# Patient Record
Sex: FEMALE | Race: BLACK OR AFRICAN AMERICAN | URBAN - METROPOLITAN AREA
[De-identification: names, ages, dates, MRNs, and addresses within clinical notes are randomized per-mention and may not be internally consistent; named-entity substitution may affect disease eponyms.]

---

## 2017-03-13 ENCOUNTER — IMPORTED ENCOUNTER (OUTPATIENT)
Dept: URBAN - METROPOLITAN AREA CLINIC 1 | Facility: CLINIC | Age: 52
End: 2017-03-13

## 2017-03-13 NOTE — PATIENT DISCUSSION
Patient is a candidate for LASIK OU. Aware she needs to be out of her contact lenses for two weeks prior to surgery. Educated about presbyopia and aware she will need readers post surgery. Patient understands she may not achieve perfect 20/20 and may need glasses for night driving.   She also knows she will likely have dry eye post surgery

## 2017-04-03 ENCOUNTER — IMPORTED ENCOUNTER (OUTPATIENT)
Dept: URBAN - METROPOLITAN AREA CLINIC 1 | Facility: CLINIC | Age: 52
End: 2017-04-03

## 2017-04-03 NOTE — PATIENT DISCUSSION
LASIK OU 04/07/17. Patient given paper RX for Valium and coupon for Durezol. Ocuflox and Durezol sent in to [facility]. Consent signed today.

## 2017-04-07 ENCOUNTER — IMPORTED ENCOUNTER (OUTPATIENT)
Dept: URBAN - METROPOLITAN AREA CLINIC 1 | Facility: CLINIC | Age: 52
End: 2017-04-07

## 2017-04-08 ENCOUNTER — IMPORTED ENCOUNTER (OUTPATIENT)
Dept: URBAN - METROPOLITAN AREA CLINIC 1 | Facility: CLINIC | Age: 52
End: 2017-04-08

## 2017-04-08 PROBLEM — Z09: Noted: 2017-04-08

## 2017-04-08 NOTE — PATIENT DISCUSSION
POD #1 LASIK OU - doing well. BCL removed by PMG with no complications. Cont Durezol BID OUCont Ocuflox BID OUCont ATs QID OU routinely. RTC as scheduled 4/14/17 w/ PMG.

## 2017-04-14 ENCOUNTER — IMPORTED ENCOUNTER (OUTPATIENT)
Dept: URBAN - METROPOLITAN AREA CLINIC 1 | Facility: CLINIC | Age: 52
End: 2017-04-14

## 2017-04-14 PROBLEM — Z98.89: Noted: 2017-04-14

## 2017-04-14 NOTE — PATIENT DISCUSSION
POW #1 LASIK OU - doing well. [facility] excellent. Use Durezol BID OU till out then d/c Cont Tears QID OU routinely. Can use 1.25-1.50 for near vision. Return for an appointment in 3 week LASIK PO OU with Dr. Tristan Baird.

## 2017-05-06 ENCOUNTER — IMPORTED ENCOUNTER (OUTPATIENT)
Dept: URBAN - METROPOLITAN AREA CLINIC 1 | Facility: CLINIC | Age: 52
End: 2017-05-06

## 2017-05-06 PROBLEM — Z98.89: Noted: 2017-05-06

## 2017-05-06 PROCEDURE — 99024 POSTOP FOLLOW-UP VISIT: CPT

## 2017-05-06 NOTE — PATIENT DISCUSSION
POM #1 LASIK OU - [facility] doing well. Increase in IOP today 38/36. Steroid Responder. DC Durezol Begin Combigan BID OU (sample given). 1 gtt instilled by RBF in office today Continue Tears QID Routinely OUReturn for an appointment in monday/tuesday 10 (IOP check) with Dr. Claudeen Cobble.

## 2017-05-08 ENCOUNTER — IMPORTED ENCOUNTER (OUTPATIENT)
Dept: URBAN - METROPOLITAN AREA CLINIC 1 | Facility: CLINIC | Age: 52
End: 2017-05-08

## 2017-05-08 PROBLEM — Z98.89: Noted: 2017-05-08

## 2017-05-08 PROCEDURE — 99024 POSTOP FOLLOW-UP VISIT: CPT

## 2017-05-08 NOTE — PATIENT DISCUSSION
1. POM #1 LASIK OU - doing well IOP much improved today 18/16. Cont Combigan BID OU until gone Continue Tears QID Routinely OU 2. Steroid ResponderReturn for an appointment in 2 months final LASIK PO (will subsequently need COAG work up) with Dr. Torsten Del Real.

## 2017-07-10 ENCOUNTER — IMPORTED ENCOUNTER (OUTPATIENT)
Dept: URBAN - METROPOLITAN AREA CLINIC 1 | Facility: CLINIC | Age: 52
End: 2017-07-10

## 2017-07-10 PROBLEM — Z98.89: Noted: 2017-07-10

## 2017-07-10 NOTE — PATIENT DISCUSSION
1. POM #3 LASIK OU - doing well. Continue Tears QID Routinely OU 2. OHTN OU/ Glaucoma Suspect (CD 0.5/0.4) Steroid Responder. Fm Hx (Mother). Patient should return for baseline testing. Return for an appointment in 3 mo 30 OCT/ VF 24-2 OU with Dr. Annel Gil.

## 2022-03-18 PROBLEM — I10 HYPERTENSION, UNCONTROLLED: Status: ACTIVE | Noted: 2020-08-17

## 2022-03-19 PROBLEM — R07.89 ATYPICAL CHEST PAIN: Status: ACTIVE | Noted: 2020-08-17

## 2022-03-19 PROBLEM — R10.9 ABDOMINAL PAIN: Status: ACTIVE | Noted: 2018-01-23

## 2022-03-19 PROBLEM — K21.9 GERD (GASTROESOPHAGEAL REFLUX DISEASE): Status: ACTIVE | Noted: 2020-08-17

## 2022-03-19 PROBLEM — E87.6 HYPOKALEMIA: Status: ACTIVE | Noted: 2020-08-17

## 2022-03-19 PROBLEM — R07.9 CHEST PAIN: Status: ACTIVE | Noted: 2020-08-17

## 2022-04-02 ASSESSMENT — VISUAL ACUITY
OD_CC: 20/30+1
OS_SC: 20/25
OS_CC: 20/20
OD_CC: 20/20
OD_CC: 20/25
OS_CC: 20/20
OS_CC: 20/30
OD_CC: 20/20
OS_CC: 20/25
OD_CC: 20/20
OD_CC: J2
OS_CC: 20/25

## 2022-04-02 ASSESSMENT — KERATOMETRY
OS_K1POWER_DIOPTERS: 42.75
OD_K1POWER_DIOPTERS: 42.50
OS_K2POWER_DIOPTERS: 44.75
OD_K2POWER_DIOPTERS: 45.00
OS_K2POWER_DIOPTERS: 45.00
OS_AXISANGLE2_DEGREES: 078
OS_K1POWER_DIOPTERS: 43.00
OD_AXISANGLE2_DEGREES: 086
OD_AXISANGLE2_DEGREES: 089
OS_AXISANGLE2_DEGREES: 082
OD_K2POWER_DIOPTERS: 45.00
OD_K1POWER_DIOPTERS: 42.25

## 2022-04-02 ASSESSMENT — TONOMETRY
OD_IOP_MMHG: 24
OD_IOP_MMHG: 18
OS_IOP_MMHG: 36
OS_IOP_MMHG: 16
OS_IOP_MMHG: 20
OD_IOP_MMHG: 38
OS_IOP_MMHG: 22
OD_IOP_MMHG: 20

## 2023-03-10 ENCOUNTER — HOSPITAL ENCOUNTER (EMERGENCY)
Facility: HOSPITAL | Age: 58
Discharge: HOME | End: 2023-03-10
Attending: EMERGENCY MEDICINE
Payer: COMMERCIAL

## 2023-03-10 ENCOUNTER — APPOINTMENT (OUTPATIENT)
Facility: HOSPITAL | Age: 58
End: 2023-03-10
Payer: COMMERCIAL

## 2023-03-10 VITALS
SYSTOLIC BLOOD PRESSURE: 181 MMHG | OXYGEN SATURATION: 100 % | HEART RATE: 70 BPM | TEMPERATURE: 98.3 F | RESPIRATION RATE: 16 BRPM | DIASTOLIC BLOOD PRESSURE: 115 MMHG

## 2023-03-10 DIAGNOSIS — I10 ESSENTIAL HYPERTENSION: ICD-10-CM

## 2023-03-10 DIAGNOSIS — M47.812 NECK ARTHRITIS: Primary | ICD-10-CM

## 2023-03-10 LAB
ALBUMIN SERPL-MCNC: 4.1 G/DL (ref 3.4–5)
ALBUMIN/GLOB SERPL: 1.1 (ref 0.8–1.7)
ALP SERPL-CCNC: 102 U/L (ref 45–117)
ALT SERPL-CCNC: 43 U/L (ref 13–56)
ANION GAP SERPL CALC-SCNC: 4 MMOL/L (ref 3–18)
AST SERPL-CCNC: 33 U/L (ref 10–38)
BASOPHILS # BLD: 0 K/UL (ref 0–0.1)
BASOPHILS NFR BLD: 0 % (ref 0–2)
BILIRUB SERPL-MCNC: 0.3 MG/DL (ref 0.2–1)
BUN SERPL-MCNC: 25 MG/DL (ref 7–18)
BUN/CREAT SERPL: 23 (ref 12–20)
CALCIUM SERPL-MCNC: 9 MG/DL (ref 8.5–10.1)
CHLORIDE SERPL-SCNC: 105 MMOL/L (ref 100–111)
CO2 SERPL-SCNC: 29 MMOL/L (ref 21–32)
CREAT SERPL-MCNC: 1.07 MG/DL (ref 0.6–1.3)
DIFFERENTIAL METHOD BLD: ABNORMAL
EOSINOPHIL # BLD: 0.2 K/UL (ref 0–0.4)
EOSINOPHIL NFR BLD: 2 % (ref 0–5)
ERYTHROCYTE [DISTWIDTH] IN BLOOD BY AUTOMATED COUNT: 13.4 % (ref 11.6–14.5)
GLOBULIN SER CALC-MCNC: 3.8 G/DL (ref 2–4)
GLUCOSE SERPL-MCNC: 102 MG/DL (ref 74–99)
HCT VFR BLD AUTO: 38.8 % (ref 35–45)
HGB BLD-MCNC: 12.9 G/DL (ref 12–16)
IMM GRANULOCYTES # BLD AUTO: 0 K/UL (ref 0–0.04)
IMM GRANULOCYTES NFR BLD AUTO: 0 % (ref 0–0.5)
LYMPHOCYTES # BLD: 2.2 K/UL (ref 0.9–3.6)
LYMPHOCYTES NFR BLD: 25 % (ref 21–52)
MCH RBC QN AUTO: 31.2 PG (ref 24–34)
MCHC RBC AUTO-ENTMCNC: 33.2 G/DL (ref 31–37)
MCV RBC AUTO: 93.9 FL (ref 78–100)
MONOCYTES # BLD: 0.6 K/UL (ref 0.05–1.2)
MONOCYTES NFR BLD: 6 % (ref 3–10)
NEUTS SEG # BLD: 5.9 K/UL (ref 1.8–8)
NEUTS SEG NFR BLD: 66 % (ref 40–73)
NRBC # BLD: 0 K/UL (ref 0–0.01)
NRBC BLD-RTO: 0 PER 100 WBC
PLATELET # BLD AUTO: 276 K/UL (ref 135–420)
PMV BLD AUTO: 10.3 FL (ref 9.2–11.8)
POTASSIUM SERPL-SCNC: 3.9 MMOL/L (ref 3.5–5.5)
PROT SERPL-MCNC: 7.9 G/DL (ref 6.4–8.2)
RBC # BLD AUTO: 4.13 M/UL (ref 4.2–5.3)
SODIUM SERPL-SCNC: 138 MMOL/L (ref 136–145)
WBC # BLD AUTO: 8.9 K/UL (ref 4.6–13.2)

## 2023-03-10 PROCEDURE — 96374 THER/PROPH/DIAG INJ IV PUSH: CPT | Performed by: EMERGENCY MEDICINE

## 2023-03-10 PROCEDURE — 6370000000 HC RX 637 (ALT 250 FOR IP): Performed by: EMERGENCY MEDICINE

## 2023-03-10 PROCEDURE — 70450 CT HEAD/BRAIN W/O DYE: CPT

## 2023-03-10 PROCEDURE — 99284 EMERGENCY DEPT VISIT MOD MDM: CPT | Performed by: EMERGENCY MEDICINE

## 2023-03-10 PROCEDURE — 80053 COMPREHEN METABOLIC PANEL: CPT

## 2023-03-10 PROCEDURE — 85025 COMPLETE CBC W/AUTO DIFF WBC: CPT

## 2023-03-10 PROCEDURE — 6360000002 HC RX W HCPCS: Performed by: EMERGENCY MEDICINE

## 2023-03-10 RX ORDER — KETOROLAC TROMETHAMINE 15 MG/ML
15 INJECTION, SOLUTION INTRAMUSCULAR; INTRAVENOUS EVERY 6 HOURS PRN
Status: DISCONTINUED | OUTPATIENT
Start: 2023-03-10 | End: 2023-03-10 | Stop reason: HOSPADM

## 2023-03-10 RX ORDER — LOSARTAN POTASSIUM 50 MG/1
100 TABLET ORAL DAILY
Status: DISCONTINUED | OUTPATIENT
Start: 2023-03-10 | End: 2023-03-10

## 2023-03-10 RX ORDER — MELOXICAM 7.5 MG/1
7.5 TABLET ORAL DAILY
Qty: 30 TABLET | Refills: 0 | Status: SHIPPED | OUTPATIENT
Start: 2023-03-10

## 2023-03-10 RX ORDER — LOSARTAN POTASSIUM 50 MG/1
100 TABLET ORAL
Status: COMPLETED | OUTPATIENT
Start: 2023-03-10 | End: 2023-03-10

## 2023-03-10 RX ORDER — LIDOCAINE 4 G/G
1 PATCH TOPICAL
Status: DISCONTINUED | OUTPATIENT
Start: 2023-03-10 | End: 2023-03-10 | Stop reason: HOSPADM

## 2023-03-10 RX ORDER — LIDOCAINE 50 MG/G
1 PATCH TOPICAL DAILY
Qty: 10 PATCH | Refills: 0 | Status: SHIPPED | OUTPATIENT
Start: 2023-03-10 | End: 2023-03-20

## 2023-03-10 RX ADMIN — LOSARTAN POTASSIUM 100 MG: 50 TABLET, FILM COATED ORAL at 06:27

## 2023-03-10 RX ADMIN — KETOROLAC TROMETHAMINE 15 MG: 15 INJECTION, SOLUTION INTRAMUSCULAR; INTRAVENOUS at 06:10

## 2023-03-10 ASSESSMENT — PAIN DESCRIPTION - LOCATION: LOCATION: NECK

## 2023-03-10 ASSESSMENT — ENCOUNTER SYMPTOMS
FACIAL SWELLING: 0
ABDOMINAL PAIN: 1
SHORTNESS OF BREATH: 0
SORE THROAT: 0

## 2023-03-10 ASSESSMENT — PAIN SCALES - GENERAL: PAINLEVEL_OUTOF10: 6

## 2023-03-10 ASSESSMENT — PAIN DESCRIPTION - ORIENTATION: ORIENTATION: LEFT

## 2023-03-10 ASSESSMENT — PAIN DESCRIPTION - DESCRIPTORS: DESCRIPTORS: DULL

## 2023-03-10 NOTE — ED PROVIDER NOTES
`[facility] EMERGENCY DEPT  eMERGENCY dEPARTMENT eNCOUnter      Pt Name: Sridevi Guardado  MRN: [id number]  [place] 1965 of evaluation: 3/10/2023  Provider:Yovanny Casanova MD    CHIEF COMPLAINT         HPI  Sridevi Guardado is a 59 y.o. female per chart review has a h/o hypertension and headaches. Tonight the patient developed a severe pounding headache that has been persistent. No nausea or vomiting      ROS  Review of Systems   Constitutional:  Positive for activity change. HENT:  Negative for congestion, ear discharge, ear pain, facial swelling and sore throat. Respiratory:  Negative for shortness of breath. Cardiovascular:  Negative for chest pain. Gastrointestinal:  Positive for abdominal pain. Musculoskeletal:  Positive for arthralgias. Skin: Negative. Neurological:  Positive for headaches. Negative for dizziness, tremors, seizures, facial asymmetry, weakness and numbness. Psychiatric/Behavioral:  Negative for behavioral problems and confusion. The patient is not nervous/anxious. Except as noted above the remainder of the review of systems was reviewed and negative.        PAST MEDICAL HISTORY     Past Medical History:   Diagnosis Date    Gastrointestinal disorder     High cholesterol     Hypertension     Sinusitis     Vertigo          SURGICAL HISTORY       Past Surgical History:   Procedure Laterality Date    GYN      fibroid embolization    ORTHOPEDIC SURGERY Left     foot tendon repair    TUBAL LIGATION           CURRENTMEDICATIONS       Previous Medications    ALBUTEROL SULFATE HFA (PROVENTIL;VENTOLIN;PROAIR) 108 (90 BASE) MCG/ACT INHALER    Inhale 2 puffs into the lungs every 4 hours as needed    AMLODIPINE (NORVASC) 2.5 MG TABLET    Take 10 mg by mouth daily    BUTALBITAL-APAP-CAFFEINE -40 MG CAPS PER CAPSULE    Take 1 capsule by mouth every 6 hours as needed    CETIRIZINE (ZYRTEC) 10 MG TABLET    Take 10 mg by mouth    FLUTICASONE (FLONASE) 50 MCG/ACT NASAL SPRAY    2 sprays by Nasal route daily    METHOCARBAMOL (ROBAXIN) 750 MG TABLET    Take 750 mg by mouth 4 times daily as needed    NAPROXEN (NAPROSYN) 375 MG TABLET    Take 375 mg by mouth 2 times daily (with meals)    OLMESARTAN (BENICAR) 40 MG TABLET    TK 1 T PO QD    PANTOPRAZOLE (PROTONIX) 40 MG TABLET    Take 40 mg by mouth daily    POTASSIUM CHLORIDE (KLOR-CON M) 20 MEQ EXTENDED RELEASE TABLET    Take 20 mEq by mouth daily    PROMETHAZINE (PHENERGAN) 12.5 MG TABLET    Take 12.5 mg by mouth every 8 hours as needed       ALLERGIES     Nalbuphine, Ciprofloxacin, and Hydrocodone-acetaminophen    FAMILY HISTORY       Family History   Problem Relation Age of Onset    Diabetes Maternal Grandmother     Asthma Sister     Heart Disease Maternal Grandmother     Asthma Brother     Heart Disease Sister     Stroke Maternal Grandmother     Heart Failure Maternal Grandmother           SOCIAL HISTORY       Social History     Socioeconomic History    Marital status:    Tobacco Use    Smoking status: Never    Smokeless tobacco: Never   Substance and Sexual Activity    Alcohol use: No    Drug use: No         PHYSICAL EXAM       ED Triage Vitals [03/10/23 0220]   BP Temp Temp src Heart Rate Resp SpO2 Height Weight   (!) 177/109 98.3 °F (36.8 °C) -- 70 16 100 % -- --       Physical Exam  Vitals and nursing note reviewed. Constitutional:       General: She is not in acute distress. Appearance: Normal appearance. She is not ill-appearing, toxic-appearing or diaphoretic. HENT:      Head: Normocephalic and atraumatic. Right Ear: Tympanic membrane, ear canal and external ear normal. There is no impacted cerumen. Left Ear: Tympanic membrane, ear canal and external ear normal. There is no impacted cerumen. Nose: Nose normal. No congestion or rhinorrhea. Mouth/Throat:      Mouth: Mucous membranes are moist.      Pharynx: Oropharynx is clear. No oropharyngeal exudate or posterior oropharyngeal erythema.    Eyes: General:         Right eye: No discharge. Left eye: No discharge. Cardiovascular:      Rate and Rhythm: Normal rate and regular rhythm. Pulses: Normal pulses. Heart sounds: Normal heart sounds. No murmur heard. No friction rub. No gallop. Pulmonary:      Effort: Pulmonary effort is normal. No respiratory distress. Breath sounds: Normal breath sounds. No stridor. No wheezing, rhonchi or rales. Chest:      Chest wall: No tenderness. Abdominal:      General: Abdomen is flat. Palpations: Abdomen is soft. Musculoskeletal:         General: No tenderness or signs of injury. Normal range of motion. Cervical back: Normal range of motion and neck supple. No tenderness. Lymphadenopathy:      Cervical: No cervical adenopathy. Skin:     General: Skin is warm and dry. Findings: No erythema or rash. Neurological:      General: No focal deficit present. Mental Status: She is alert and oriented to person, place, and time. Psychiatric:         Mood and Affect: Mood normal.         Behavior: Behavior normal.       No results found for this or any previous visit (from the past 24 hour(s)). No results found. PROCEDURES:  Unless otherwise noted below, none     Procedures      EMERGENCY DEPARTMENT COURSE and DIFFERENTIALDIAGNOSIS/ MDM:   Vitals:    Vitals:    03/10/23 0220   BP: (!) 177/109   Pulse: 70   Resp: 16   Temp: 98.3 °F (36.8 °C)   SpO2: 100%       [facility]        3:44 AM Upon re-evaluation the patient's symptoms have improved. Pt has non-toxic appearance and condition is stable for discharge. Patient was informed of tests & results, instructed to f/u with @ and return to the ED upon worsening of symptoms. All questions and concerns were addressed.      FINAL IMPRESSION        Migraine    Arthrits neck    Hypertension    DISPOSITION/PLAN     DISPOSITION  -  D/C    DISCHARGE MEDICATIONS:  New Prescriptions    No medications on file           PATIENT REFERRED TO: PCP in 2 days. Return to ER prn. (Please note that portions of this note were completed with a voice recognitionprogram.  Efforts were made to edit the dictations but occasionally words are mis-transcribed.)    Isatu Araya.  Bebe Christensen MD(electronically signed)  Attending Emergency Physician           Shiloh Mack MD  03/12/23 9599

## 2023-03-10 NOTE — ED NOTES
Informed Dr. Kern Number /102, per  to give pt 40 mg Benicar. Informed MD that medication not in pyxis and that per  mg of Losartan is comparable to 40 mg of Benicar. Per  to give Losartan 100 mg and monitor BP prior to discharge.      Radha Meng RN  03/10/23 8994

## 2023-03-10 NOTE — ED NOTES
Discharge teaching provided to pt regarding treatment received, medications prescribed, and follow-up care. Pt verbalized understanding directions and follow up care. Pt left ambulatory with discharge paperwork in hand.       Tri Aguero RN  03/10/23 7911

## 2023-03-10 NOTE — DISCHARGE INSTRUCTIONS
Pinched Nerve in the Neck: Care Instructions  Overview  A pinched nerve in the neck happens when a vertebra or disc in the upper part of your spine squeezes a nerve. This can happen because of an injury. Or it can just happen with age. The changes that happen from an injury or aging may put pressure on a nearby nerve root, pinching it. This causes symptoms such as sharp pain in your neck, shoulder, arm, hand, or back. You may also have tingling or numbness. Sometimes it makes your arm weaker. The symptoms may get worse when you turn your head, cough, or sneeze. For many people, the symptoms get better over time and finally go away. Early treatment usually includes medicines for pain and swelling. Sometimes physical therapy and special exercises may help. Follow-up care is a key part of your treatment and safety. Be sure to make and go to all appointments, and call your doctor if you are having problems. It's also a good idea to know your test results and keep a list of the medicines you take. How can you care for yourself at home? Be safe with medicines. Read and follow all instructions on the label. If the doctor gave you a prescription medicine for pain, take it as prescribed. If you are not taking a prescription pain medicine, ask your doctor if you can take an over-the-counter medicine. Try using a heating pad on a low or medium setting for 15 to 20 minutes every 2 or 3 hours. Try a warm shower in place of one session with the heating pad. You can also buy single-use heat wraps that last up to 8 hours. You can also try an ice pack for 10 to 15 minutes every 2 to 3 hours. There isn't strong evidence that either heat or ice will help. But you can try them to see if they help you. Don't spend too long in one position. Take short breaks to move around and change positions. Wear a seat belt and shoulder harness when you are in a car.   Sleep with a pillow under your head and neck that keeps your neck straight. If you were given a neck brace (cervical collar) to limit neck motion, wear it as instructed for as many days as your doctor tells you to. Do not wear it longer than you were told to. Wearing a brace for too long can lead to neck stiffness and can weaken the neck muscles. Follow your doctor's instructions for gentle neck-stretching exercises. Do not smoke. Smoking can slow healing of your discs. If you need help quitting, talk to your doctor about stop-smoking programs and medicines. These can increase your chances of quitting for good. Avoid activities that may make your symptoms worse. Ask your doctor when you can start doing those activities again. When should you call for help? Call 911 anytime you think you may need emergency care. For example, call if:    You are unable to move an arm or a leg at all. Call your doctor now or seek immediate medical care if:    You have new or worse symptoms in your arms, legs, chest, belly, or buttocks. Symptoms may include:  Numbness or tingling. Weakness. Pain. You lose bladder or bowel control. Watch closely for changes in your health, and be sure to contact your doctor if:    You are not getting better as expected. Where can you learn more? Go to http://www.woods.com/ and enter Y143 to learn more about \"Pinched Nerve in the Neck: Care Instructions. \"  Current as of: August 25, 2022               Content Version: 13.5  © 5385-6896 [employer]. Care instructions adapted under license by [facility] [employer] [facility] ([facility]). If you have questions about a medical condition or this instruction, always ask your healthcare professional. [place] [address] any warranty or liability for your use of this information.

## 2023-12-03 ENCOUNTER — APPOINTMENT (OUTPATIENT)
Facility: HOSPITAL | Age: 58
End: 2023-12-03
Payer: COMMERCIAL

## 2023-12-03 ENCOUNTER — HOSPITAL ENCOUNTER (EMERGENCY)
Facility: HOSPITAL | Age: 58
Discharge: HOME | End: 2023-12-03
Attending: EMERGENCY MEDICINE
Payer: COMMERCIAL

## 2023-12-03 VITALS
DIASTOLIC BLOOD PRESSURE: 77 MMHG | OXYGEN SATURATION: 95 % | BODY MASS INDEX: 37.21 KG/M2 | TEMPERATURE: 98.2 F | RESPIRATION RATE: 16 BRPM | WEIGHT: 210 LBS | SYSTOLIC BLOOD PRESSURE: 165 MMHG | HEIGHT: 63 IN | HEART RATE: 101 BPM

## 2023-12-03 DIAGNOSIS — R05.1 ACUTE COUGH: ICD-10-CM

## 2023-12-03 DIAGNOSIS — H66.002 NON-RECURRENT ACUTE SUPPURATIVE OTITIS MEDIA OF LEFT EAR WITHOUT SPONTANEOUS RUPTURE OF TYMPANIC MEMBRANE: Primary | ICD-10-CM

## 2023-12-03 LAB
ALBUMIN SERPL-MCNC: 3.8 G/DL (ref 3.4–5)
ALBUMIN/GLOB SERPL: 1 (ref 0.8–1.7)
ALP SERPL-CCNC: 110 U/L (ref 45–117)
ALT SERPL-CCNC: 30 U/L (ref 13–56)
ANION GAP SERPL CALC-SCNC: 5 MMOL/L (ref 3–18)
AST SERPL-CCNC: 24 U/L (ref 10–38)
BASOPHILS # BLD: 0 K/UL (ref 0–0.1)
BASOPHILS NFR BLD: 0 % (ref 0–2)
BILIRUB SERPL-MCNC: 0.4 MG/DL (ref 0.2–1)
BUN SERPL-MCNC: 12 MG/DL (ref 7–18)
BUN/CREAT SERPL: 13 (ref 12–20)
CALCIUM SERPL-MCNC: 8.8 MG/DL (ref 8.5–10.1)
CHLORIDE SERPL-SCNC: 109 MMOL/L (ref 100–111)
CO2 SERPL-SCNC: 28 MMOL/L (ref 21–32)
CREAT SERPL-MCNC: 0.96 MG/DL (ref 0.6–1.3)
DIFFERENTIAL METHOD BLD: ABNORMAL
EKG ATRIAL RATE: 101 BPM
EKG DIAGNOSIS: NORMAL
EKG P AXIS: 66 DEGREES
EKG P-R INTERVAL: 158 MS
EKG Q-T INTERVAL: 346 MS
EKG QRS DURATION: 86 MS
EKG QTC CALCULATION (BAZETT): 448 MS
EKG R AXIS: -10 DEGREES
EKG T AXIS: 58 DEGREES
EKG VENTRICULAR RATE: 101 BPM
EOSINOPHIL # BLD: 0.3 K/UL (ref 0–0.4)
EOSINOPHIL NFR BLD: 3 % (ref 0–5)
ERYTHROCYTE [DISTWIDTH] IN BLOOD BY AUTOMATED COUNT: 13.2 % (ref 11.6–14.5)
FLUAV AG NPH QL IA: NEGATIVE
FLUBV AG NOSE QL IA: NEGATIVE
GLOBULIN SER CALC-MCNC: 4 G/DL (ref 2–4)
GLUCOSE SERPL-MCNC: 109 MG/DL (ref 74–99)
HCT VFR BLD AUTO: 38.5 % (ref 35–45)
HGB BLD-MCNC: 12.9 G/DL (ref 12–16)
IMM GRANULOCYTES # BLD AUTO: 0 K/UL (ref 0–0.04)
IMM GRANULOCYTES NFR BLD AUTO: 0 % (ref 0–0.5)
LYMPHOCYTES # BLD: 2.8 K/UL (ref 0.9–3.6)
LYMPHOCYTES NFR BLD: 29 % (ref 21–52)
MCH RBC QN AUTO: 31.2 PG (ref 24–34)
MCHC RBC AUTO-ENTMCNC: 33.5 G/DL (ref 31–37)
MCV RBC AUTO: 93 FL (ref 78–100)
MONOCYTES # BLD: 0.6 K/UL (ref 0.05–1.2)
MONOCYTES NFR BLD: 6 % (ref 3–10)
NEUTS SEG # BLD: 5.8 K/UL (ref 1.8–8)
NEUTS SEG NFR BLD: 60 % (ref 40–73)
NRBC # BLD: 0 K/UL (ref 0–0.01)
NRBC BLD-RTO: 0 PER 100 WBC
NT PRO BNP: 241 PG/ML (ref 0–900)
PLATELET # BLD AUTO: 259 K/UL (ref 135–420)
PMV BLD AUTO: 10.4 FL (ref 9.2–11.8)
POTASSIUM SERPL-SCNC: 3.4 MMOL/L (ref 3.5–5.5)
PROT SERPL-MCNC: 7.8 G/DL (ref 6.4–8.2)
RBC # BLD AUTO: 4.14 M/UL (ref 4.2–5.3)
SARS-COV-2 RDRP RESP QL NAA+PROBE: NOT DETECTED
SODIUM SERPL-SCNC: 142 MMOL/L (ref 136–145)
SOURCE: NORMAL
TROPONIN I SERPL HS-MCNC: 9 NG/L (ref 0–54)
WBC # BLD AUTO: 9.7 K/UL (ref 4.6–13.2)

## 2023-12-03 PROCEDURE — 85025 COMPLETE CBC W/AUTO DIFF WBC: CPT

## 2023-12-03 PROCEDURE — 93005 ELECTROCARDIOGRAM TRACING: CPT | Performed by: EMERGENCY MEDICINE

## 2023-12-03 PROCEDURE — 6360000002 HC RX W HCPCS: Performed by: EMERGENCY MEDICINE

## 2023-12-03 PROCEDURE — 87804 INFLUENZA ASSAY W/OPTIC: CPT

## 2023-12-03 PROCEDURE — 80053 COMPREHEN METABOLIC PANEL: CPT

## 2023-12-03 PROCEDURE — 93010 ELECTROCARDIOGRAM REPORT: CPT | Performed by: INTERNAL MEDICINE

## 2023-12-03 PROCEDURE — 83880 ASSAY OF NATRIURETIC PEPTIDE: CPT

## 2023-12-03 PROCEDURE — 84484 ASSAY OF TROPONIN QUANT: CPT

## 2023-12-03 PROCEDURE — 87635 SARS-COV-2 COVID-19 AMP PRB: CPT

## 2023-12-03 PROCEDURE — 71046 X-RAY EXAM CHEST 2 VIEWS: CPT

## 2023-12-03 RX ORDER — HYDRALAZINE HYDROCHLORIDE 20 MG/ML
20 INJECTION INTRAMUSCULAR; INTRAVENOUS ONCE
Status: COMPLETED | OUTPATIENT
Start: 2023-12-03 | End: 2023-12-03

## 2023-12-03 RX ORDER — BENZONATATE 100 MG/1
100 CAPSULE ORAL 2 TIMES DAILY PRN
Qty: 20 CAPSULE | Refills: 0 | Status: SHIPPED | OUTPATIENT
Start: 2023-12-03 | End: 2023-12-13

## 2023-12-03 RX ORDER — LABETALOL HYDROCHLORIDE 5 MG/ML
10 INJECTION, SOLUTION INTRAVENOUS
Status: COMPLETED | OUTPATIENT
Start: 2023-12-03 | End: 2023-12-03

## 2023-12-03 RX ORDER — LABETALOL HYDROCHLORIDE 5 MG/ML
10 INJECTION, SOLUTION INTRAVENOUS ONCE
Status: DISCONTINUED | OUTPATIENT
Start: 2023-12-03 | End: 2023-12-03

## 2023-12-03 RX ORDER — AMOXICILLIN AND CLAVULANATE POTASSIUM 875; 125 MG/1; MG/1
1 TABLET, FILM COATED ORAL 2 TIMES DAILY
Qty: 14 TABLET | Refills: 0 | Status: SHIPPED | OUTPATIENT
Start: 2023-12-03 | End: 2023-12-10

## 2023-12-03 RX ADMIN — HYDRALAZINE HYDROCHLORIDE 20 MG: 20 INJECTION, SOLUTION INTRAMUSCULAR; INTRAVENOUS at 11:17

## 2023-12-03 RX ADMIN — LABETALOL HYDROCHLORIDE 10 MG: 5 INJECTION, SOLUTION INTRAVENOUS at 12:54

## 2023-12-03 ASSESSMENT — PAIN - FUNCTIONAL ASSESSMENT
PAIN_FUNCTIONAL_ASSESSMENT: NONE - DENIES PAIN
PAIN_FUNCTIONAL_ASSESSMENT: 0-10

## 2023-12-03 ASSESSMENT — PAIN SCALES - GENERAL: PAINLEVEL_OUTOF10: 6

## 2023-12-03 ASSESSMENT — LIFESTYLE VARIABLES
HOW OFTEN DO YOU HAVE A DRINK CONTAINING ALCOHOL: NEVER
HOW MANY STANDARD DRINKS CONTAINING ALCOHOL DO YOU HAVE ON A TYPICAL DAY: PATIENT DOES NOT DRINK

## 2023-12-03 NOTE — ED NOTES
Discharge instructions reviewed with patient. Patient advised to follow up as directed on discharge instructions. Patient denies questions, needs or concerns at this time. Patient verbalized understanding. No s/sx of distress noted.         Carmen Faith RN  12/03/23 1537

## 2023-12-04 LAB
EKG ATRIAL RATE: 100 BPM
EKG DIAGNOSIS: NORMAL
EKG P AXIS: 80 DEGREES
EKG P-R INTERVAL: 156 MS
EKG Q-T INTERVAL: 352 MS
EKG QRS DURATION: 82 MS
EKG QTC CALCULATION (BAZETT): 454 MS
EKG R AXIS: 13 DEGREES
EKG T AXIS: 60 DEGREES
EKG VENTRICULAR RATE: 100 BPM

## 2023-12-04 PROCEDURE — 93010 ELECTROCARDIOGRAM REPORT: CPT | Performed by: INTERNAL MEDICINE

## 2023-12-18 NOTE — ED PROVIDER NOTES
significantly elevated blood pressures at 203/94. The patient does not have any signs of endorgan damage in the ED. She received 1 dose of hydralazine and then a dose of labetalol. Her discharge blood pressure was 165/77. She will be discharged home with Tessalon Perles and Augmentin. She has been advised to follow-up with her primary care physician in 2 to 3 days. Return precautions have been given. The patient does state that she has an upcoming appointment with nephrology for her blood pressure. I advised her to keep that appointment and keep a blood pressure log in the meantime. Disposition Considerations (tests considered but not done, Admit vs D/C, Shared Decision Making, Pt Expectation of Test or Tx.): 0       Critical Care Time:     Critical Care Procedure Note  Authorized and Performed by: Kesha Crocker MD  Total critical care time: Approximately 36 minutes  Due to a high probability of clinically significant, life threatening deterioration, the patient required my highest level of preparedness to intervene emergently and I personally spent this critical care time directly and personally managing the patient. This critical care time included obtaining a history; examining the patient; pulse oximetry; ordering and review of studies; arranging urgent treatment with development of a management plan; evaluation of patient's response to treatment; frequent reassessment; and, discussions with other providers. This critical care time was performed to assess and manage the high probability of imminent, life-threatening deterioration that could result in multi-organ failure. It was exclusive of separately billable procedures and treating other patients and teaching time. Please see MDM section and the rest of the note for further information on patient assessment and treatment.       Sabine Santizo MD    Diagnosis and Disposition     I Kesha Crocker MD am the primary clinician of

## 2024-09-18 ENCOUNTER — HOSPITAL ENCOUNTER (EMERGENCY)
Facility: HOSPITAL | Age: 59
Discharge: HOME | End: 2024-09-18
Attending: EMERGENCY MEDICINE
Payer: COMMERCIAL

## 2024-09-18 VITALS
HEART RATE: 76 BPM | TEMPERATURE: 98 F | OXYGEN SATURATION: 99 % | RESPIRATION RATE: 16 BRPM | SYSTOLIC BLOOD PRESSURE: 176 MMHG | HEIGHT: 63 IN | DIASTOLIC BLOOD PRESSURE: 98 MMHG | BODY MASS INDEX: 36.32 KG/M2 | WEIGHT: 205 LBS

## 2024-09-18 DIAGNOSIS — M10.9 GOUTY ARTHRITIS: Primary | ICD-10-CM

## 2024-09-18 LAB
ANION GAP BLD CALC-SCNC: ABNORMAL MMOL/L (ref 10–20)
BASE EXCESS BLD CALC-SCNC: 6.4 MMOL/L
CA-I BLD-MCNC: 1.15 MMOL/L (ref 1.15–1.33)
CHLORIDE BLD-SCNC: 102 MMOL/L (ref 98–107)
CO2 BLD-SCNC: 32 MMOL/L (ref 22–29)
CREAT BLD-MCNC: 0.9 MG/DL (ref 0.6–1.3)
GLUCOSE BLD-MCNC: 92 MG/DL (ref 74–99)
HCO3 BLD-SCNC: 31.8 MMOL/L (ref 21–28)
LACTATE BLD-SCNC: 0.78 MMOL/L (ref 0.4–2)
PCO2 BLDV: 48.1 MMHG (ref 41–51)
PH BLDV: 7.43 (ref 7.32–7.42)
PO2 BLDV: 24 MMHG (ref 25–40)
POTASSIUM BLD-SCNC: 3.7 MMOL/L (ref 3.5–5.1)
SAO2 % BLD: 44 % (ref 94–98)
SERVICE CMNT-IMP: ABNORMAL
SODIUM BLD-SCNC: 142 MMOL/L (ref 136–145)
SPECIMEN SITE: ABNORMAL

## 2024-09-18 PROCEDURE — 83605 ASSAY OF LACTIC ACID: CPT

## 2024-09-18 PROCEDURE — 82330 ASSAY OF CALCIUM: CPT

## 2024-09-18 PROCEDURE — 6370000000 HC RX 637 (ALT 250 FOR IP): Performed by: EMERGENCY MEDICINE

## 2024-09-18 PROCEDURE — 82803 BLOOD GASES ANY COMBINATION: CPT

## 2024-09-18 PROCEDURE — 82947 ASSAY GLUCOSE BLOOD QUANT: CPT

## 2024-09-18 PROCEDURE — 99283 EMERGENCY DEPT VISIT LOW MDM: CPT

## 2024-09-18 PROCEDURE — 84295 ASSAY OF SERUM SODIUM: CPT

## 2024-09-18 PROCEDURE — 85014 HEMATOCRIT: CPT

## 2024-09-18 PROCEDURE — 84132 ASSAY OF SERUM POTASSIUM: CPT

## 2024-09-18 RX ORDER — COLCHICINE 0.6 MG/1
1.2 CAPSULE ORAL
Status: COMPLETED | OUTPATIENT
Start: 2024-09-18 | End: 2024-09-18

## 2024-09-18 RX ORDER — ACETAMINOPHEN 500 MG
500 TABLET ORAL EVERY 6 HOURS PRN
Qty: 30 TABLET | Refills: 1 | Status: SHIPPED | OUTPATIENT
Start: 2024-09-18

## 2024-09-18 RX ORDER — METHYLPREDNISOLONE 4 MG
TABLET, DOSE PACK ORAL
Qty: 1 KIT | Refills: 0 | Status: SHIPPED | OUTPATIENT
Start: 2024-09-18 | End: 2024-09-24

## 2024-09-18 RX ADMIN — COLCHICINE 1.2 MG: 0.6 CAPSULE ORAL at 12:20

## 2024-09-18 ASSESSMENT — PAIN SCALES - GENERAL: PAINLEVEL_OUTOF10: 8

## 2024-09-18 ASSESSMENT — PAIN DESCRIPTION - LOCATION: LOCATION: FOOT;LEG

## 2024-09-18 ASSESSMENT — PAIN - FUNCTIONAL ASSESSMENT: PAIN_FUNCTIONAL_ASSESSMENT: 0-10

## 2024-09-18 ASSESSMENT — ENCOUNTER SYMPTOMS
ABDOMINAL PAIN: 0
EYES NEGATIVE: 1
CHEST TIGHTNESS: 0

## 2024-09-18 ASSESSMENT — PAIN DESCRIPTION - ORIENTATION: ORIENTATION: RIGHT;LEFT
